# Patient Record
Sex: FEMALE | Race: OTHER | URBAN - METROPOLITAN AREA
[De-identification: names, ages, dates, MRNs, and addresses within clinical notes are randomized per-mention and may not be internally consistent; named-entity substitution may affect disease eponyms.]

---

## 2024-11-02 ENCOUNTER — APPOINTMENT (OUTPATIENT)
Dept: URGENT CARE | Facility: MEDICAL CENTER | Age: 29
End: 2024-11-02

## 2025-04-29 ENCOUNTER — OFFICE VISIT (OUTPATIENT)
Dept: OBGYN CLINIC | Facility: CLINIC | Age: 30
End: 2025-04-29

## 2025-04-29 VITALS
BODY MASS INDEX: 23.68 KG/M2 | HEIGHT: 61 IN | DIASTOLIC BLOOD PRESSURE: 78 MMHG | SYSTOLIC BLOOD PRESSURE: 122 MMHG | RESPIRATION RATE: 16 BRPM | HEART RATE: 70 BPM | WEIGHT: 125.4 LBS

## 2025-04-29 DIAGNOSIS — R87.611 ATYPICAL SQUAMOUS CELLS CANNOT EXCLUDE HIGH GRADE SQUAMOUS INTRAEPITHELIAL LESION ON CYTOLOGIC SMEAR OF CERVIX (ASC-H): Primary | ICD-10-CM

## 2025-04-29 LAB — SL AMB POCT URINE HCG: NORMAL

## 2025-04-29 PROCEDURE — 88344 IMHCHEM/IMCYTCHM EA MLT ANTB: CPT | Performed by: PATHOLOGY

## 2025-04-29 PROCEDURE — 88305 TISSUE EXAM BY PATHOLOGIST: CPT | Performed by: PATHOLOGY

## 2025-04-29 NOTE — PROGRESS NOTES
"OB/GYN VISIT  Mayra Torres  2025  10:53 AM      Assessment/Plan:    Mayra Torres is a 30 y.o. parous female with ASC-H, HPV other pos, requiring colposcopy. See procedure note for details. RTC in 2 weeks for result review.    Subjective:     Mayra Torres is a 30 y.o. parous female who presents for evaluation of abnormal pap smear at an outside clinic. She reports she had a pap smear that was abnormal (she does not know what the specific result was) after which she had a colposcopy with no biopsies. She then had a repeat pap smear a month ago that was also abnormal. On record review, she had ASC-H with HPV other positive in 3/2025 and was recommended by the other clinic to present here for a colposcopy. She has been sexually active with one male partner for the past 10 years, and is a non-smoker. She denies any vaginal bleeding, itching or abnormal discharge.    Objective:    Vitals: Blood pressure 122/78, pulse 70, resp. rate 16, height 5' 1\" (1.549 m), weight 56.9 kg (125 lb 6.4 oz).Body mass index is 23.69 kg/m².    History reviewed. No pertinent past medical history.  Past Surgical History:   Procedure Laterality Date     SECTION         Physical Exam  HENT:      Head: Normocephalic.   Eyes:      Conjunctiva/sclera: Conjunctivae normal.   Cardiovascular:      Rate and Rhythm: Normal rate.      Pulses: Normal pulses.   Pulmonary:      Effort: Pulmonary effort is normal.   Abdominal:      Palpations: Abdomen is soft.      Tenderness: There is no abdominal tenderness.   Genitourinary:     General: Normal vulva.      Comments: Speculum: normal vaginal mucosa, cervical mucosa as described in procedure note. Hemostatic cervix at the end of procedure  Skin:     General: Skin is warm.   Neurological:      Mental Status: She is alert and oriented to person, place, and time.   Psychiatric:         Mood and Affect: Mood normal.           Parul Enriquez MD  2025  10:53 AM         "

## 2025-04-29 NOTE — PROGRESS NOTES
Colposcopy    Date/Time: 4/29/2025 8:00 AM    Performed by: Parul Enriquez MD  Authorized by: Parul Enriquez MD    Verbal consent obtained?: Yes    Written consent obtained?: Yes    Risks and benefits: Risks, benefits and alternatives were discussed    Consent given by:  Patient  Time Out:     Time out: Immediately prior to the procedure a time out was called    Patient states understanding of procedure being performed: Yes    Patient's understanding of procedure matches consent: Yes    Procedure consent matches procedure scheduled: Yes    Relevant documents present and verified: Yes    Test results available and properly labeled: Yes    Site marked: No    Radiology Images displayed and confirmed. If images not available, report reviewed: Yes    Required items: Required blood products, implants, devices and special equipment available    Patient identity confirmed:  Verbally with patient  Pre-procedure:     Negative urine pregnancy test: yes      Prepped with: acetic acid    Indication:     Indication:  ASC-H  Procedure:     Procedure: Colposcopy w/ cervical biopsy and ECC      Under satisfactory analgesia the patient was prepped and draped in the dorsal lithotomy position: yes      [place] speculum was placed in the vagina: yes      Cervix was cleansed with betadine: yes      Under colposcopic examination the transition zone was seen in entirety: yes      Intracervical block was performed: no      Endocervix was curetted using a Kevorkian curette: yes      Cervical biopsy performed with a cervical biopsy punch: yes      Monsel's solution was applied: yes      Specimen(s) to pathology: yes    Post-procedure:     Findings: White epithelium      Impression: Low grade cervical dysplasia      Patient tolerance of procedure:  Tolerated well, no immediate complications  Comments:      Aceto-white changes from 6 o'clock to 12 o'clock positions. No neovascularization, punctation or other abnormal changes  visualized

## 2025-05-15 NOTE — PROGRESS NOTES
"Assessment/Plan:     No problem-specific Assessment & Plan notes found for this encounter.          Diagnoses and all orders for this visit:    Moderate cervical dysplasia    Encounter to discuss test results    RTO for pre-op H&P.          Subjective:      Patient ID: Mayra Torres is a 30 y.o. female who presents for colposcopy results.  Her pathology is below:        Final Diagnosis   A. Cervix, Endocervical, ecc:  -Mild to moderate squamous dysplasia with HPV cytopathic effect ( KENYATTA I- KENYATTA II)   -Detached benign endocervical epithelium  Note  P16 stain show diffuse epithelial staining. Ki-67 highlights dysplastic cells to middle 1/3 of surface epithelium.  Controls reacted appropriately .This staining pattern supports the above diagnosis. of KENYATTA II      B. Cervix, 12:  -The specimen did not survive processing      C. Cervix, 7:  -Mild to moderate squamous dysplasia with HPV cytopathic effect ( KENYATTA I- KENYATTA II)   Note  P16 stain show diffuse epithelial staining. Ki-67 highlights dysplastic cells to middle 1/3 of surface epithelium.  Controls reacted appropriately . This staining pattern supports the above diagnosis. of KENYATTA II       Her results were shared with the patient and consent was signed for LEEP of the cervix.  Greek interpretor was used, # is on the consent.  The patient is s/p tubal ligation.  Written information was provided on the procedure.  She is agreeable to the plan.    HPI    The following portions of the patient's history were reviewed and updated as appropriate: allergies, current medications, past family history, past medical history, past social history, past surgical history and problem list.    Review of Systems      Objective:      /80 (BP Location: Left arm, Patient Position: Sitting, Cuff Size: Adult)   Pulse 73   Ht 5' 1\" (1.549 m)   Wt 57.2 kg (126 lb)   BMI 23.81 kg/m²          Physical Exam  Vitals and nursing note reviewed.   Constitutional:       Appearance: Normal " appearance.   HENT:      Head: Normocephalic and atraumatic.   Pulmonary:      Effort: Pulmonary effort is normal.     Neurological:      General: No focal deficit present.      Mental Status: She is alert and oriented to person, place, and time.     Psychiatric:         Mood and Affect: Mood normal.         Behavior: Behavior normal.

## 2025-05-19 ENCOUNTER — OFFICE VISIT (OUTPATIENT)
Dept: OBGYN CLINIC | Facility: CLINIC | Age: 30
End: 2025-05-19

## 2025-05-19 VITALS
DIASTOLIC BLOOD PRESSURE: 80 MMHG | HEIGHT: 61 IN | WEIGHT: 126 LBS | BODY MASS INDEX: 23.79 KG/M2 | SYSTOLIC BLOOD PRESSURE: 121 MMHG | HEART RATE: 73 BPM

## 2025-05-19 DIAGNOSIS — Z71.2 ENCOUNTER TO DISCUSS TEST RESULTS: ICD-10-CM

## 2025-05-19 DIAGNOSIS — N87.1 MODERATE CERVICAL DYSPLASIA: Primary | ICD-10-CM

## 2025-05-19 PROCEDURE — 99213 OFFICE O/P EST LOW 20 MIN: CPT | Performed by: OBSTETRICS & GYNECOLOGY

## 2025-05-29 ENCOUNTER — TELEPHONE (OUTPATIENT)
Dept: OBGYN CLINIC | Facility: CLINIC | Age: 30
End: 2025-05-29

## 2025-05-29 NOTE — TELEPHONE ENCOUNTER
[facility] Surgical Case Review  Date Reviewed: 5/29/25  Reviewed by: Raghav Richter MD  Case request: EUA Leep  Indication: KENYATTA II  Surgical Consent: 5/19/25 MA30/31: N/A    Case request approved: approved    Comments:  none    Estrella Avilez DO  PGY-II, OBGYN  05/29/25

## 2025-05-30 ENCOUNTER — TELEPHONE (OUTPATIENT)
Dept: OBGYN CLINIC | Facility: CLINIC | Age: 30
End: 2025-05-30

## 2025-05-30 NOTE — TELEPHONE ENCOUNTER
Called patient to schedule surgery (using [employer] -  hung up before I could ask her for Interp #     Patient would like to wait to schedule.She needs to figure out how much the surgery will cost her    I gave her the number to     Patient will call back when she decides on what she wants to do     Gave her Sejal's Direct number   ([phone])

## 2025-05-30 NOTE — TELEPHONE ENCOUNTER
----- Message -----   From: Shoshana Samaniego MD   Sent: 2025   8:58 AM EDT   To: Sejal Lisa     [facility] GYN Department   Surgery Scheduling Sheet     Patient Name: Mayra Torres   : 1995     Provider: Shoshana Samaniego MD      Needed: yes; Language: Romanian and N/A     Procedure: exam under anesthesia and loop electrosurgical excision procedure     Diagnosis: KENYATTA 1-2     Special Needs or Equipment: none     Anesthesia: choice     Length of stay: outpatient   Does patient have comorbid conditions that will require close perioperative monitoring prior to safe discharge: no     The patient has comorbid conditions that will require close perioperative monitoring prior to safe discharge, including N/A.   This may require acute care beyond the usual and routine recovery period. As such, inpatient admission post-operatively is expected and appropriate, and anticipated hospital length of stay will be >2 midnights.     Pre-Admission Testing Needed: yes   Labs that should be ordered: urine pregnancy test     Order PAT that is recommended in prep for procedure?: Yes     Medical Clearance Needed: no; Provider: N/A     MA Form Signed (tubals/hysterectomy): Not Indicated     Surgical Drink Given: no     How many days out of work: 1 day(s)     How many days no drivin week(s)       Is pre op appt needed?  yes   Interval for post op appt: 2 week(s)       For Surgical Scheduler:     Surgery Scheduled On:   [place]: [facility]     Pre-op Appt:   Post op Appt:   Consult/Medical clearance appt:

## 2025-07-07 ENCOUNTER — TELEPHONE (OUTPATIENT)
Dept: OBGYN CLINIC | Facility: CLINIC | Age: 30
End: 2025-07-07

## 2025-07-16 ENCOUNTER — CONSULT (OUTPATIENT)
Dept: OBGYN CLINIC | Facility: CLINIC | Age: 30
End: 2025-07-16

## 2025-07-16 VITALS
HEIGHT: 61 IN | HEART RATE: 71 BPM | WEIGHT: 125.8 LBS | DIASTOLIC BLOOD PRESSURE: 68 MMHG | SYSTOLIC BLOOD PRESSURE: 120 MMHG | BODY MASS INDEX: 23.75 KG/M2

## 2025-07-16 DIAGNOSIS — N87.9 CERVICAL INTRAEPITHELIAL NEOPLASIA: Primary | ICD-10-CM

## 2025-07-16 PROCEDURE — 99212 OFFICE O/P EST SF 10 MIN: CPT | Performed by: OBSTETRICS & GYNECOLOGY

## 2025-07-16 NOTE — PROGRESS NOTES
Name: Mayra Torres      : 1995      MRN: [id number]  Encounter Provider: Fely Marie MD  Encounter Date: 2025   Encounter department: [facility]  :  Assessment & Plan  Cervical intraepithelial neoplasia  - Pap 3/3/25 showing ASC-H and HPV other high risk positive  - Colposcopy 25 showing KENYATTA II  - Patient previously consented for EUA, LEEP on 25, scheduled for 25  - Reviewed risks of procedure including infection and bleeding and discussed expectations following surgery (possible abnormal colored discharge from Monsel's solution, cramping)  - Nothing to eat or drink after midnight the night before surgery  - Follow up in clinic for post op visit on 25 scheduled      History of Present Illness   HPI  Mayra Torres is a 30 y.o. female who presents for preoperative visit. She reports feeling well today and has no specific questions.      Review of Systems   Constitutional:  Negative for chills and fever.   HENT: Negative.     Respiratory:  Negative for cough and shortness of breath.    Cardiovascular:  Negative for chest pain.   Gastrointestinal:  Negative for abdominal pain, nausea and vomiting.   Genitourinary: Negative.  Negative for vaginal bleeding.   Musculoskeletal: Negative.    Neurological:  Negative for headaches.   Psychiatric/Behavioral: Negative.            Objective   There were no vitals taken for this visit.     Physical Exam  Constitutional:       General: She is not in acute distress.  HENT:      Head: Normocephalic and atraumatic.     Cardiovascular:      Rate and Rhythm: Normal rate.   Pulmonary:      Effort: Pulmonary effort is normal. No respiratory distress.   Abdominal:      General: There is no distension.     Skin:     General: Skin is warm and dry.     Neurological:      General: No focal deficit present.     Psychiatric:         Mood and Affect: Mood normal.         Fely Marie MD  OBGYN, PGY-3  25  3:49  PM

## 2025-07-16 NOTE — ASSESSMENT & PLAN NOTE
- Pap 3/3/25 showing ASC-H and HPV other high risk positive  - Colposcopy 4/29/25 showing KENYATTA II  - Patient previously consented for EUA, LEEP on 5/19/25, scheduled for 8/5/25  - Reviewed risks of procedure including infection and bleeding and discussed expectations following surgery (possible abnormal colored discharge from Monsel's solution, cramping)  - Nothing to eat or drink after midnight the night before surgery  - Follow up in clinic for post op visit on 8/18/25 scheduled

## 2025-07-31 ENCOUNTER — TELEPHONE (OUTPATIENT)
Dept: OBGYN CLINIC | Facility: CLINIC | Age: 30
End: 2025-07-31

## 2025-08-01 ENCOUNTER — TELEPHONE (OUTPATIENT)
Dept: OBGYN CLINIC | Facility: CLINIC | Age: 30
End: 2025-08-01

## 2025-08-20 ENCOUNTER — TELEPHONE (OUTPATIENT)
Dept: OBGYN CLINIC | Facility: CLINIC | Age: 30
End: 2025-08-20

## 2025-08-22 ENCOUNTER — OFFICE VISIT (OUTPATIENT)
Dept: OBGYN CLINIC | Facility: CLINIC | Age: 30
End: 2025-08-22

## 2025-08-22 VITALS
DIASTOLIC BLOOD PRESSURE: 71 MMHG | HEIGHT: 61 IN | WEIGHT: 124.6 LBS | HEART RATE: 72 BPM | RESPIRATION RATE: 18 BRPM | BODY MASS INDEX: 23.53 KG/M2 | SYSTOLIC BLOOD PRESSURE: 112 MMHG

## 2025-08-22 DIAGNOSIS — N87.9 CERVICAL INTRAEPITHELIAL NEOPLASIA: ICD-10-CM

## 2025-08-22 DIAGNOSIS — Z01.818 PRE-OPERATIVE EXAMINATION: Primary | ICD-10-CM
